# Patient Record
Sex: FEMALE | Race: ASIAN | URBAN - NONMETROPOLITAN AREA
[De-identification: names, ages, dates, MRNs, and addresses within clinical notes are randomized per-mention and may not be internally consistent; named-entity substitution may affect disease eponyms.]

---

## 2017-11-03 ENCOUNTER — COMMUNICATION - GICH (OUTPATIENT)
Dept: FAMILY MEDICINE | Facility: OTHER | Age: 32
End: 2017-11-03

## 2017-11-03 DIAGNOSIS — Z30.41 ENCOUNTER FOR SURVEILLANCE OF CONTRACEPTIVE PILLS: ICD-10-CM

## 2017-12-28 NOTE — TELEPHONE ENCOUNTER
Patient Information     Patient Name MRN Sex Marnie Kumar [id number] Female 1985      Telephone Encounter by Virginia Hawkins RN at 2017  4:23 PM     Author:  Virginia Hawkins RN Service:  (none) Author Type:  NURS- Registered Nurse     Filed:  2017  4:41 PM Encounter Date:  11/3/2017 Status:  Signed     :  Virginia Hawkins RN (NURS- Registered Nurse)            Hormones    Office visit in the past 12 months or per provider note.    Last visit with Annabella Manuel DO was on: 10/11/2016  Next visit with Annabella Manuel DO is on: No future appointment listed with this provider  Next visit with Family Practice is on: No future appointment listed in this department    Max refill for 12 months from last office visit or per provider note.    Patient is due for medication management appointment. Limited refill provided at this time. [employer] message and/or letter sent for reminder to patient. Prescription refilled per RN Medication Refill Policy.................... Virginia Hawkins RN ....................  2017   4:39 PM

## 2018-02-26 ENCOUNTER — DOCUMENTATION ONLY (OUTPATIENT)
Dept: FAMILY MEDICINE | Facility: OTHER | Age: 33
End: 2018-02-26

## 2018-02-26 RX ORDER — CYCLOBENZAPRINE HCL 10 MG
10 TABLET ORAL 3 TIMES DAILY
COMMUNITY
Start: 2016-12-07

## 2018-02-26 RX ORDER — DESOGESTREL AND ETHINYL ESTRADIOL 0.15-0.03
1 KIT ORAL DAILY
COMMUNITY
Start: 2017-11-06

## 2018-03-25 ENCOUNTER — HEALTH MAINTENANCE LETTER (OUTPATIENT)
Age: 33
End: 2018-03-25

## 2018-04-03 RX ORDER — DESOGESTREL AND ETHINYL ESTRADIOL 0.15-0.03
KIT ORAL
Qty: 84 TABLET | Refills: 0 | OUTPATIENT
Start: 2018-04-03

## 2018-04-03 NOTE — TELEPHONE ENCOUNTER
Refill request inappropriate. Record review indicates patient was seen on 2/8/18 at [facility]. Contacted [facility] in [place], [place], Please disregard this request it was filled by another provider from [facility]. Unable to complete prescription refill per RNMedication Refill Policy.................... Starr Merino ....................  4/3/2018   12:38 PM

## 2018-07-24 NOTE — PROGRESS NOTES
Patient Information     Patient Name  Marnie Mchugh MRN  [id number] Sex  Female   1985      Letter by Amanda Knight MD at      Author:  Amanda Knight MD Service:  (none) Author Type:  (none)    Filed:   Encounter Date:  11/3/2017 Status:  (Other)           Marnie Mchugh  [address]  [facility] [address]          2017    Dear Ms. Mchugh:    This letter is to remind you that you are due for your annual exam with DOC YAÑEZ DO. Your last comprehensive visit was more than 12 months ago.    A LIMITED refill of ENSKYCE tablet has been called into your pharmacy. Additional refills require you to complete this appointment.    Please call the clinic at [phone] to schedule your appointment.    If you should require additional refills before your scheduled appointment, please contact your pharmacy and we will refill your medication until the date of your appointment.    If you are no longer seeing DOC YAÑEZ DO for primary care, please call to let us know. Doing so will remove you from our call/contact list.      Thank you for choosing [facility] and [facility] for your health care needs.    Sincerely,    Refill RN  [facility]